# Patient Record
Sex: FEMALE | Race: WHITE | Employment: UNEMPLOYED | URBAN - METROPOLITAN AREA
[De-identification: names, ages, dates, MRNs, and addresses within clinical notes are randomized per-mention and may not be internally consistent; named-entity substitution may affect disease eponyms.]

---

## 2017-01-25 ENCOUNTER — ED HISTORICAL/CONVERTED ENCOUNTER (OUTPATIENT)
Dept: OTHER | Age: 52
End: 2017-01-25

## 2017-03-06 ENCOUNTER — ED HISTORICAL/CONVERTED ENCOUNTER (OUTPATIENT)
Dept: OTHER | Age: 52
End: 2017-03-06

## 2021-03-10 ENCOUNTER — TRANSCRIBE ORDER (OUTPATIENT)
Dept: SCHEDULING | Age: 56
End: 2021-03-10

## 2021-03-10 ENCOUNTER — HOSPITAL ENCOUNTER (OUTPATIENT)
Dept: CT IMAGING | Age: 56
Discharge: HOME | End: 2021-03-10
Attending: INTERNAL MEDICINE
Payer: COMMERCIAL

## 2021-03-10 DIAGNOSIS — R52 TENDERNESS: ICD-10-CM

## 2021-03-10 DIAGNOSIS — R10.9 ABDOMINAL PAIN: ICD-10-CM

## 2021-03-10 DIAGNOSIS — R10.9 ABDOMINAL PAIN: Primary | ICD-10-CM

## 2021-03-10 LAB
BUN SERPL-MCNC: 12 MG/DL (ref 6–20)
CREAT SERPL-MCNC: 0.95 MG/DL (ref 0.55–1.02)

## 2021-03-10 PROCEDURE — 82565 ASSAY OF CREATININE: CPT

## 2021-03-10 PROCEDURE — 36415 COLL VENOUS BLD VENIPUNCTURE: CPT

## 2021-03-10 PROCEDURE — 74011000636 HC RX REV CODE- 636: Performed by: INTERNAL MEDICINE

## 2021-03-10 PROCEDURE — 84520 ASSAY OF UREA NITROGEN: CPT

## 2021-03-10 PROCEDURE — 74178 CT ABD&PLV WO CNTR FLWD CNTR: CPT

## 2021-03-10 RX ADMIN — IOPAMIDOL 100 ML: 755 INJECTION, SOLUTION INTRAVENOUS at 14:03

## 2021-09-17 ENCOUNTER — OFFICE VISIT (OUTPATIENT)
Dept: HEMATOLOGY | Age: 56
End: 2021-09-17
Payer: COMMERCIAL

## 2021-09-17 VITALS
OXYGEN SATURATION: 96 % | HEIGHT: 67 IN | BODY MASS INDEX: 26.71 KG/M2 | WEIGHT: 170.2 LBS | SYSTOLIC BLOOD PRESSURE: 127 MMHG | DIASTOLIC BLOOD PRESSURE: 77 MMHG | HEART RATE: 94 BPM | TEMPERATURE: 96.3 F | RESPIRATION RATE: 16 BRPM

## 2021-09-17 DIAGNOSIS — K76.0 FATTY LIVER: Primary | ICD-10-CM

## 2021-09-17 PROBLEM — G89.29 CHRONIC BACK PAIN: Status: ACTIVE | Noted: 2021-09-17

## 2021-09-17 PROBLEM — E78.00 HYPERCHOLESTEROLEMIA: Status: ACTIVE | Noted: 2021-09-17

## 2021-09-17 PROBLEM — M54.9 CHRONIC BACK PAIN: Status: ACTIVE | Noted: 2021-09-17

## 2021-09-17 PROCEDURE — 99204 OFFICE O/P NEW MOD 45 MIN: CPT | Performed by: INTERNAL MEDICINE

## 2021-09-17 PROCEDURE — 91200 LIVER ELASTOGRAPHY: CPT | Performed by: INTERNAL MEDICINE

## 2021-09-17 RX ORDER — ESTRADIOL 0.5 MG/1
TABLET ORAL DAILY
COMMUNITY
End: 2022-03-18

## 2021-09-17 NOTE — PROGRESS NOTES
Identified pt with two pt identifiers(name and ). Reviewed record in preparation for visit and have obtained necessary documentation.   Chief Complaint   Patient presents with    New Patient      referral by Dr. Kartik Salazar for fatty liver disease        Health Maintenance Due   Topic    Hepatitis C Screening     COVID-19 Vaccine (1)    DTaP/Tdap/Td series (1 - Tdap)    Cervical cancer screen     Lipid Screen     Colorectal Cancer Screening Combo     Shingrix Vaccine Age 50> (1 of 2)    Breast Cancer Screen Mammogram     Flu Vaccine (1)        Visit Vitals  /77 (BP 1 Location: Right arm, BP Patient Position: Sitting, BP Cuff Size: Adult)   Pulse 94   Temp (!) 96.3 °F (35.7 °C) (Temporal)   Resp 16   Ht 5' 7\" (1.702 m)   Wt 170 lb 3.2 oz (77.2 kg)   SpO2 96%   BMI 26.66 kg/m²     Pain Scale: /10

## 2021-09-17 NOTE — Clinical Note
10/7/2021    Patient: Christa Fox   YOB: 1965   Date of Visit: 9/17/2021     [place]ío [place], MD  Formerly [facility] [id number]  Via In [address] [facility] 103 [address] [address]  Via Fax: [phone]    Dear MD Maegan Ortiz MD,      Thank you for referring Ms. Christa Fox to [address] for evaluation. My notes for this consultation are attached. If you have questions, please do not hesitate to call me. I look forward to following your patient along with you.       Sincerely,    Brandon Guerrero MD

## 2021-09-17 NOTE — PROGRESS NOTES
[address] [facility]      Sendy Castorena MD, Dilan Calvillo, Renetta Camargo MD, MPH      PETROS Moser, [facility]     Candelaria Starr, [facility]   Alton Burton, P[facility]    Luwanna Closs, [facility]       [place] Foote [facility] 136    at [address], [address] [place] [address], [facility] 22.    [phone]    FAX: [address] [place] [address], [address] [place] [address] - [address]    [phone]    FAX: [phone]       Patient Care Team:  Kristopher Magallanes MD as PCP - General (Internal Medicine)      Problem List  Date Reviewed: 9/17/2021        Codes Class Noted    Fatty liver ICD-10-CM: K76.0  ICD-9-CM: 571.8  9/17/2021        Chronic back pain ICD-10-CM: M54.9, G89.29  ICD-9-CM: 724.5, 338.29  9/17/2021        Hypercholesterolemia ICD-10-CM: E78.00  ICD-9-CM: 272.0  9/17/2021              The clinicians listed above have asked me to see Vamsi Kelley in consultation regarding elevated liver enzymes and its management. All medical records sent by the referring physicians were reviewed including imaging studies     The patient is a 64 y.o.  female who was found to have elevated  alkaline phosphatase in 5/2021. Serologic evaluation for markers of chronic liver disease was negative. Ultrasound of the liver was performed in 5/2021. The results of the imaging suggested fatty liver disease. Assessment of liver fibrosis with Fibroscan was performed in the office today. The result was 5.7 kPa which correlates with no fibrosis - stage 1 portal fibrosis. The CAP score of 331 suggests hepatic steatosis.     The patient has the following symptoms which could be attributed to the liver disorder:    pain in the right side over the liver,     The patient is not experiencing the following symptoms which are commonly seen in this liver disorder:   fatigue,     The patient completes all daily activities without any functional       ASSESSMENT AND PLAN:  Benign steatosis/NAFL  Suspect the patient has fatty liver based upon imaging, Fiboscan CAP score, features of metabolic syndrome, serologic studies that are negative for other causes of chronic liver disease,     A liver biopsy has not been performed. Fibroscan in 9/2021 demonstrated  5.7 kPa and  suggesting fatty and stage 0-1 fibrosis. NAFL is a benign form of fatty liver disease and not thought to progress to fibrosis or cirrhosis. Liver transaminases are normal.  ALP is elevated. Liver function is normal.  The platelet count is   normal.      Based upon laboratory studies Fibroscan, and imaging  the patient does not appear to have significant liver injury. Serologic testing for causes of chronic liver disease were all negative but I do not see HCV. She says she was told this was negative. Will repeat anti-HCV at the next office appointment to be certain. There is no reason to perform liver biopsy with a Fibroscan and Fibrosure both suggesting no fibrosis. The Fibroscan can be repeated annually or as often as clinically indicated to assess for fibrosis progression and/or regression. If the patient looses 20% of current body weight, which is 34 pounds, down to a weight of of 140 pounds, all steatosis will have resolved. Once all steatosis has resolved all inflammation will resolve. Then all fibrosis will gradually resolve and the liver could eventually be normal.    There is currently no FDA approved medical treatment for fatty liver, NALFD or SILVA. She could not be considered for a clinical trial for treatment of SILVA since Fatty liver is too mild and she does not appear to have SILVA.       Counseling for diet and weight loss in patients with confirmed or suspected NAFLD  The patient was counseled regarding diet and exercise to achieve weight loss. The best diet for patients with fatty liver is one very low in carbohydrates and enriched with protein such as an Ron's program.      The patient was told not to consume any food products and drinks containing fructose as this enhances hepatic fat synthesis. There is no medication or vitamin supplements that we advocate for SILVA. Using glitazones in patients without diabetes mellitus has been shown to reduce fat content in the liver but has no effect on fibrosis and is associated with weight gain. Vitamin E has also been used but the data is not very good and most experts no longer advocate this. Screening for Hepatocellular Carcinoma  HCC screening is not necessary since the patient has no evidence of cirrhosis. Treatment of other medical problems in patients with chronic liver disease  There are no contraindications for the patient to take most medications that are necessary for treatment of other medical issues. Normal doses of acetaminophen, as recommended on the label of the bottle, are not hepatotoxic except in the setting of daily alcohol use, even in patients with cirrhosis and can be utilized for pain. Counseling for alcohol in patients with chronic liver disease  The patient was counseled regarding alcohol consumption and the effect of alcohol on chronic liver disease. The patient does not consume any significant amount of alcohol. Vaccinations   The need for vaccination against viral hepatitis A and B will be assessed with serologic and instituted as appropriate. The patient has not received of COVID-19 vaccine. Routine vaccinations against other bacterial and viral agents can be performed as indicated. Annual flu vaccination should be administered if indicated.       ALLERGIES  No Known Allergies    MEDICATIONS  Current Outpatient Medications   Medication Sig    celecoxib (CELEBREX PO) Take  by mouth.    estradioL (ESTRACE) 0.5 mg tablet Take  by mouth daily. No current facility-administered medications for this visit. SYSTEM REVIEW NOT RELATED TO LIVER DISEASE OR REVIEWED ABOVE:  Constitution systems: Negative for fever, chills, weight gain, weight loss. Eyes: Negative for visual changes. ENT: Negative for sore throat, painful swallowing. Respiratory: Negative for cough, hemoptysis, SOB. Cardiology: Negative for chest pain, palpitations. GI:  Negative for constipation or diarrhea. : Negative for urinary frequency, dysuria, hematuria, nocturia. Skin: Negative for rash. Hematology: Negative for easy bruising, blood clots. Musculo-skelatal: Negative for back pain, muscle pain, weakness. Neurologic: Negative for headaches, dizziness, vertigo, memory problems not related to HE. Psychology: Negative for anxiety, depression. FAMILY HISTORY:  The father disease(s): COPD. The mother  of heart disease. There is no family history of liver disease. SOCIAL HISTORY:  The patient is . The patient has 2 children, and 6 grandchildren. The patient currently smokes 1/2 pack of tobacco daily. The patient has never consumed significant amounts of alcohol. The patient used to work as  at [employer]. The patient has not worked since . PHYSICAL EXAMINATION:  Visit Vitals  /77 (BP 1 Location: Right arm, BP Patient Position: Sitting, BP Cuff Size: Adult)   Pulse 94   Temp (!) 96.3 °F (35.7 °C) (Temporal)   Resp 16   Ht 5' 7\" (1.702 m)   Wt 170 lb 3.2 oz (77.2 kg)   SpO2 96%   BMI 26.66 kg/m²     General: No acute distress. Eyes: Sclera anicteric. ENT: No oral lesions. Thyroid normal.  Nodes: No adenopathy. Skin: No spider angiomata. No jaundice. No palmar erythema. Respiratory: Lungs clear to auscultation. Cardiovascular: Regular heart rate. No murmurs. No JVD. Abdomen: Soft non-tender.   Liver size normal to percussion/palpation. Spleen not palpable. No obvious ascites. Extremities: No edema. No muscle wasting. No gross arthritic changes. Neurologic: Alert and oriented. Cranial nerves grossly intact. No asterixis. LABORATORY STUDIES:  From 7/2021  AST/ALT/ALP/T Bili/ALB: 23/25/171/0.4/4.5  WBC/HB/PLT/INR:  NA/BUN/CREAT:    AFP 4.2    SEROLOGIES:  7/2021. HBsAntigen negative, anti-HBcore negative, Ferritin 372, iron saturation 20%, TAE negative,   ASMA negative, AMA negative, ANCA negative, ceruloplsmin 33, alpha-1-antitrypsin 155. LIVER HISTOLOGY:  7/2021. Fibrosure. A0, 0.12 consistent with no fibrosis. 9/2021. [facility]. EkPa was 5.7. IQR/med 18%. . The results suggested a fibrosis level of F0-1. The CAP score suggests there is hepatic steatosis. ENDOSCOPIC PROCEDURES:  5/2021. EGD by [facility]. Normal.  2/2021. Colonoscopy by [facility]. RADIOLOGY:  3/2021. Triple phase CT scan liver. Changes consistent with fatty liver. No liver mass lesions. Normal spleen. No ascites. 5/2021. Ultrasound of liver. Echogenic consistent with fatty liver. No liver mass lesions. No dilated bile ducts. No ascites. OTHER TESTING:  Not available or performed    FOLLOW-UP:  All of the issues listed above in the Assessment and Plan were discussed with the patient. All questions were answered. The patient expressed a clear understanding of the above. [address] [address] in 6 months for routine monitoring.       MD Emmanuelle Sims 13  [address], [address] [place] [address] 22.  [phone]  [address] [facility]

## 2022-03-18 ENCOUNTER — OFFICE VISIT (OUTPATIENT)
Dept: HEMATOLOGY | Age: 57
End: 2022-03-18
Payer: COMMERCIAL

## 2022-03-18 VITALS
BODY MASS INDEX: 28.06 KG/M2 | HEART RATE: 91 BPM | DIASTOLIC BLOOD PRESSURE: 83 MMHG | SYSTOLIC BLOOD PRESSURE: 138 MMHG | TEMPERATURE: 97.2 F | HEIGHT: 67 IN | WEIGHT: 178.8 LBS

## 2022-03-18 DIAGNOSIS — K76.0 FATTY LIVER: Primary | ICD-10-CM

## 2022-03-18 LAB
ALBUMIN SERPL-MCNC: 4.1 G/DL (ref 3.5–5)
ALBUMIN/GLOB SERPL: 1.1 {RATIO} (ref 1.1–2.2)
ALP SERPL-CCNC: 144 U/L (ref 45–117)
ALT SERPL-CCNC: 42 U/L (ref 12–78)
ANION GAP SERPL CALC-SCNC: 5 MMOL/L (ref 5–15)
AST SERPL-CCNC: 26 U/L (ref 15–37)
BASOPHILS # BLD: 0 K/UL (ref 0–0.1)
BASOPHILS NFR BLD: 0 % (ref 0–1)
BILIRUB DIRECT SERPL-MCNC: 0.2 MG/DL (ref 0–0.2)
BILIRUB SERPL-MCNC: 0.4 MG/DL (ref 0.2–1)
BUN SERPL-MCNC: 17 MG/DL (ref 6–20)
BUN/CREAT SERPL: 14 (ref 12–20)
CALCIUM SERPL-MCNC: 9.4 MG/DL (ref 8.5–10.1)
CHLORIDE SERPL-SCNC: 108 MMOL/L (ref 97–108)
CO2 SERPL-SCNC: 26 MMOL/L (ref 21–32)
CREAT SERPL-MCNC: 1.19 MG/DL (ref 0.55–1.02)
DIFFERENTIAL METHOD BLD: NORMAL
EOSINOPHIL # BLD: 0.3 K/UL (ref 0–0.4)
EOSINOPHIL NFR BLD: 4 % (ref 0–7)
ERYTHROCYTE [DISTWIDTH] IN BLOOD BY AUTOMATED COUNT: 13.2 % (ref 11.5–14.5)
GLOBULIN SER CALC-MCNC: 3.6 G/DL (ref 2–4)
GLUCOSE SERPL-MCNC: 102 MG/DL (ref 65–100)
HCT VFR BLD AUTO: 40.5 % (ref 35–47)
HGB BLD-MCNC: 13.3 G/DL (ref 11.5–16)
IMM GRANULOCYTES # BLD AUTO: 0 K/UL (ref 0–0.04)
IMM GRANULOCYTES NFR BLD AUTO: 0 % (ref 0–0.5)
LYMPHOCYTES # BLD: 2.9 K/UL (ref 0.8–3.5)
LYMPHOCYTES NFR BLD: 34 % (ref 12–49)
MCH RBC QN AUTO: 31.1 PG (ref 26–34)
MCHC RBC AUTO-ENTMCNC: 32.8 G/DL (ref 30–36.5)
MCV RBC AUTO: 94.6 FL (ref 80–99)
MONOCYTES # BLD: 0.5 K/UL (ref 0–1)
MONOCYTES NFR BLD: 6 % (ref 5–13)
NEUTS SEG # BLD: 4.8 K/UL (ref 1.8–8)
NEUTS SEG NFR BLD: 56 % (ref 32–75)
NRBC # BLD: 0 K/UL (ref 0–0.01)
NRBC BLD-RTO: 0 PER 100 WBC
PLATELET # BLD AUTO: 213 K/UL (ref 150–400)
PMV BLD AUTO: 10.5 FL (ref 8.9–12.9)
POTASSIUM SERPL-SCNC: 4.5 MMOL/L (ref 3.5–5.1)
PROT SERPL-MCNC: 7.7 G/DL (ref 6.4–8.2)
RBC # BLD AUTO: 4.28 M/UL (ref 3.8–5.2)
SODIUM SERPL-SCNC: 139 MMOL/L (ref 136–145)
WBC # BLD AUTO: 8.5 K/UL (ref 3.6–11)

## 2022-03-18 PROCEDURE — 99213 OFFICE O/P EST LOW 20 MIN: CPT | Performed by: NURSE PRACTITIONER

## 2022-03-18 RX ORDER — PROGESTERONE 100 MG/1
CAPSULE ORAL
COMMUNITY
Start: 2022-03-02

## 2022-03-18 RX ORDER — ASPIRIN 325 MG
TABLET, DELAYED RELEASE (ENTERIC COATED) ORAL
COMMUNITY
Start: 2022-01-06

## 2022-03-18 RX ORDER — AZITHROMYCIN 250 MG/1
TABLET, FILM COATED ORAL
COMMUNITY
Start: 2021-12-09 | End: 2022-06-20

## 2022-03-18 RX ORDER — ROSUVASTATIN CALCIUM 40 MG/1
40 TABLET, COATED ORAL EVERY EVENING
COMMUNITY
Start: 2021-12-09

## 2022-03-18 NOTE — PROGRESS NOTES
Yury Shine MD, Bety Panchal MD, MPH      Roselle Sandhoff, PETROS Carreon, [facility]     Candelaria Starr, [facility]   Shauna Olszewski, SAIDA-GERARDO Blank, [facility]       Madanal[place]ia Foote [facility] 136    at [address], [address] [place] [address], [facility] 22.    [phone]    FAX: [address] [place] [address], [address]    [phone]    FAX: [phone]       Patient Care Team:  Marion Hunt MD as PCP - General (Internal Medicine)      Problem List  Date Reviewed: 10/7/2021          Codes Class Noted    Fatty liver ICD-10-CM: K76.0  ICD-9-CM: 571.8  9/17/2021        Chronic back pain ICD-10-CM: M54.9, G89.29  ICD-9-CM: 724.5, 338.29  9/17/2021        Hypercholesterolemia ICD-10-CM: E78.00  ICD-9-CM: 272.0  9/17/2021            Rosette Earl is being seen at [facility] for management of non-alcoholic fatty liver (NAFL). The active problem list, all pertinent past medical history, medications, radiologic findings and laboratory findings related to the liver disorder were reviewed and discussed with the patient. The patient is a 62 y.o.  female who was found to have elevated  alkaline phosphatase in 5/2021. Serologic evaluation for markers of chronic liver disease were negative. Ultrasound of the liver was performed in 5/2021. The results of the imaging suggested fatty liver disease. Assessment of liver fibrosis with Fibroscan was performed 9/2021. The result was 5.7 kPa which correlates with no fibrosis - stage 1 portal fibrosis. The CAP score of 331 suggests hepatic steatosis.     The patient has the following symptoms which could be attributed to the liver disorder: Pain in the right side over the liver. The patient is not experiencing the following symptoms which are commonly seen in this liver disorder: fatigue, abdominal swelling or itching. The patient completes all daily activities without any functional       ASSESSMENT AND PLAN:  Benign steatosis/NAFL  Suspect the patient has fatty liver based upon imaging, Fiboscan CAP score, features of metabolic syndrome, serologic studies that are negative for other causes of chronic liver disease,     A liver biopsy has not been performed. Fibroscan in 9/2021 demonstrated  5.7 kPa and  suggesting fatty and stage 0-1 fibrosis. NAFL is a benign form of fatty liver disease and not thought to progress to fibrosis or cirrhosis. Have performed laboratory testing to monitor liver function and degree of liver injury. This included BMP, hepatic panel, and CBC with platelet count. Laboratory testing from 7/2021 reviewed in detail. Follow-up testing ordered today. The AST is normal. The ALT is elevated. The ALP is elevated but improved. The liver function and platelet count are normal.     Serologic testing for causes of chronic liver disease were negative. Anti-HCV needs to be drawn at the next office visit. There is no reason to perform liver biopsy with a Fibroscan and Fibrosure both suggesting no fibrosis. The Fibroscan can be repeated annually or as often as clinically indicated to assess for fibrosis progression and/or regression. If the patient looses 10% of current body weight, which is 34 pounds, down to a weight of of 140 pounds, steatosis should resolve. Once all steatosis has resolved inflammation will resolve. Counseling for diet and weight loss in patients with confirmed or suspected NAFLD  The patient was counseled regarding diet and exercise to achieve weight loss.   The best diet for patients with fatty liver is one very low in carbohydrates and enriched with protein such as an Ron's program.      The patient was told not to consume any food products and drinks containing fructose as this enhances hepatic fat synthesis. Screening for Hepatocellular Carcinoma  HCC screening is not necessary since the patient has no evidence of cirrhosis. Treatment of other medical problems in patients with chronic liver disease  There are no contraindications for the patient to take most medications that are necessary for treatment of other medical issues. Normal doses of acetaminophen, as recommended on the label of the bottle, are not hepatotoxic except in the setting of daily alcohol use, even in patients with cirrhosis and can be utilized for pain. Counseling for alcohol in patients with chronic liver disease  The patient was counseled regarding alcohol consumption and the effect of alcohol on chronic liver disease. The patient does not consume any significant amount of alcohol. Vaccinations   The need for vaccination against viral hepatitis A and B will be assessed with serologic and instituted as appropriate. The patient has not received of COVID-19 vaccine. Routine vaccinations against other bacterial and viral agents can be performed as indicated. Annual flu vaccination should be administered if indicated. ALLERGIES  No Known Allergies    MEDICATIONS  Current Outpatient Medications   Medication Sig    progesterone (PROMETRIUM) 100 mg capsule TAKE TWO CAPSULES BY MOUTH NIGHTLY    rosuvastatin (CRESTOR) 40 mg tablet Take 40 mg by mouth every evening.  cholecalciferol (VITAMIN D3) (50,000 UNITS /1250 MCG) capsule TAKE ONE CAPSULE BY MOUTH ONCE WEEKLY    azithromycin (ZITHROMAX) 250 mg tablet TAKE 2 TABLETS BY MOUTH ON DAY 1 THEN TAKE 1 TABLET EVERY DAY FOR THE NEXT 4 DAYS    celecoxib (CELEBREX PO) Take  by mouth.  estradioL (ESTRACE) 0.5 mg tablet Take  by mouth daily. No current facility-administered medications for this visit.        SYSTEM REVIEW NOT RELATED TO LIVER DISEASE OR REVIEWED ABOVE:  Constitution systems: Negative for fever, chills, weight gain, weight loss. Eyes: Negative for visual changes. ENT: Negative for sore throat, painful swallowing. Respiratory: Negative for cough, hemoptysis, SOB. Cardiology: Negative for chest pain, palpitations. GI:  Negative for constipation or diarrhea. : Negative for urinary frequency, dysuria, hematuria, nocturia. Skin: Negative for rash. Hematology: Negative for easy bruising, blood clots. Musculo-skeletal: Negative for back pain, muscle pain, weakness. Neurologic: Negative for headaches, dizziness, vertigo, memory problems not related to HE. Psychology: Negative for anxiety, depression. FAMILY HISTORY:  The father disease(s): COPD. The mother  of heart disease. There is no family history of liver disease. SOCIAL HISTORY:  The patient is . The patient has 2 children, and 6 grandchildren. The patient currently smokes 1/2 pack of tobacco daily. The patient has never consumed significant amounts of alcohol. The patient used to work as  at [employer]. The patient has not worked since . PHYSICAL EXAMINATION:  Visit Vitals  /83 (BP 1 Location: Right arm, BP Patient Position: Sitting, BP Cuff Size: Adult)   Pulse 91   Temp 97.2 °F (36.2 °C) (Temporal)   Ht 5' 7\" (1.702 m)   Wt 178 lb 12.8 oz (81.1 kg)   BMI 28.00 kg/m²       General: No acute distress. Eyes: Sclera anicteric. ENT: No oral lesions. Thyroid normal.  Nodes: No adenopathy. Skin: No spider angiomata. No jaundice. No palmar erythema. Respiratory: Lungs clear to auscultation. Cardiovascular: Regular heart rate. No murmurs. No JVD. Abdomen: Soft non-tender, liver size normal to percussion/palpation. Spleen not palpable. No obvious ascites. Extremities: No edema. No muscle wasting. No gross arthritic changes. Neurologic: Alert and oriented.   Cranial nerves grossly intact. No asterixis. LABORATORY STUDIES:  Liver [place] of [address] [place] [address] 3/18/2022 3/10/2021   WBC 3.6 - 11.0 K/uL 8.5    ANC 1.8 - 8.0 K/UL 4.8    HGB 11.5 - 16.0 g/dL 13.3     - 400 K/uL 213    AST 15 - 37 U/L 26    ALT 12 - 78 U/L 42    Alk Phos 45 - 117 U/L 144 (H)    Bili, Total 0.2 - 1.0 MG/DL 0.4    Bili, Direct 0.0 - 0.2 MG/DL 0.2    Albumin 3.5 - 5.0 g/dL 4.1    BUN 6 - 20 MG/DL 17 12   Creat 0.55 - 1.02 MG/DL 1.19 (H) 0.95   Na 136 - 145 mmol/L 139    K 3.5 - 5.1 mmol/L 4.5    Cl 97 - 108 mmol/L 108    CO2 21 - 32 mmol/L 26    Glucose 65 - 100 mg/dL 102 (H)      From 7/2021  AST/ALT/ALP/T Bili/ALB: 23/25/171/0.4/4.5  WBC/HB/PLT/INR:  NA/BUN/CREAT:    AFP 4.2    SEROLOGIES:  7/2021. HBsAntigen negative, anti-HBcore negative, Ferritin 372, iron saturation 20%, TAE negative, ASMA negative, AMA negative, ANCA negative, ceruloplsmin 33, alpha-1-antitrypsin 155. LIVER HISTOLOGY:  7/2021. Fibrosure. A0, 0.12 consistent with no fibrosis. 9/2021. FibroScan performed at [address]. EkPa was 5.7. IQR/med 18%. . The results suggested a fibrosis level of F0-1. The CAP score suggests there is hepatic steatosis. ENDOSCOPIC PROCEDURES:  5/2021. EGD by [facility]. Normal.  2/2021. Colonoscopy by [facility]. RADIOLOGY:  3/2021. Triple phase CT scan liver. Changes consistent with fatty liver. No liver mass lesions. Normal spleen. No ascites. 5/2021. Ultrasound of liver. Echogenic consistent with fatty liver. No liver mass lesions. No dilated bile ducts. No ascites. OTHER TESTING:  Not available or performed    FOLLOW-UP:  All of the issues listed above in the Assessment and Plan were discussed with the patient. All questions were answered. The patient expressed a clear understanding of the above. [address] [address] in 3 months for a Fibroscan. GENO FordNP-[facility] of [address], [facility].  [address] [facility]

## 2022-03-18 NOTE — PROGRESS NOTES
Identified pt with two pt identifiers(name and ). Reviewed record in preparation for visit and have obtained necessary documentation. Chief Complaint   Patient presents with    Fatty Liver     6month f/u with April      Vitals:    22 0859   BP: 138/83   Pulse: 91   Temp: 97.2 °F (36.2 °C)   TempSrc: Temporal   Weight: 178 lb 12.8 oz (81.1 kg)   Height: 5' 7\" (1.702 m)   PainSc:   0 - No pain       Health Maintenance Review: Patient reminded of \"due or due soon\" health maintenance. I have asked the patient to contact his/her primary care provider (PCP) for follow-up on his/her health maintenance. Coordination of Care Questionnaire:  :   1) Have you been to an emergency room, urgent care, or hospitalized since your last visit? If yes, where when, and reason for visit? no       2. Have seen or consulted any other health care provider since your last visit? If yes, where when, and reason for visit? NO      Patient is accompanied by  I have received verbal consent from Seema Marie to discuss any/all medical information while they are present in the room.

## 2022-03-20 PROBLEM — M54.9 CHRONIC BACK PAIN: Status: ACTIVE | Noted: 2021-09-17

## 2022-03-20 PROBLEM — G89.29 CHRONIC BACK PAIN: Status: ACTIVE | Noted: 2021-09-17

## 2022-03-20 PROBLEM — K76.0 FATTY LIVER: Status: ACTIVE | Noted: 2021-09-17

## 2022-03-20 PROBLEM — E78.00 HYPERCHOLESTEROLEMIA: Status: ACTIVE | Noted: 2021-09-17

## 2022-03-21 NOTE — PROGRESS NOTES
Letter sent to the patient regarding the blood work results. The ALP improved, ALT increased slightly. The serum creatinine was elevated slightly, advised she increase hydration.  All other testing was normal.

## 2022-06-20 ENCOUNTER — OFFICE VISIT (OUTPATIENT)
Dept: HEMATOLOGY | Age: 57
End: 2022-06-20
Payer: COMMERCIAL

## 2022-06-20 VITALS
HEIGHT: 67 IN | DIASTOLIC BLOOD PRESSURE: 79 MMHG | OXYGEN SATURATION: 99 % | SYSTOLIC BLOOD PRESSURE: 151 MMHG | BODY MASS INDEX: 28.56 KG/M2 | WEIGHT: 182 LBS | TEMPERATURE: 97.2 F | HEART RATE: 79 BPM

## 2022-06-20 DIAGNOSIS — K76.0 FATTY LIVER: Primary | ICD-10-CM

## 2022-06-20 PROCEDURE — 99213 OFFICE O/P EST LOW 20 MIN: CPT | Performed by: NURSE PRACTITIONER

## 2022-06-20 PROCEDURE — 91200 LIVER ELASTOGRAPHY: CPT | Performed by: NURSE PRACTITIONER

## 2022-06-20 NOTE — PROGRESS NOTES
3340 [facility], MD, [address], [place], [place]      PETROS Contreras, ACNP-SOHAIL Gaona LEROY Starr, [facility]   ELENA Penaliharjinder Lantigua, [facility]       Madan Deputado Bradley De [place] 136    at [address], 81 [facility], [facility] 22.    [phone]    FAX: [address] [place] [address], [address]    [phone]    FAX: [phone]       Patient Care Team:  Андрей Moreno MD as PCP - General (Internal Medicine Physician)      Problem List  Date Reviewed: 3/20/2022          Codes Class Noted    Fatty liver ICD-10-CM: K76.0  ICD-9-CM: 571.8  9/17/2021        Chronic back pain ICD-10-CM: M54.9, G89.29  ICD-9-CM: 724.5, 338.29  9/17/2021        Hypercholesterolemia ICD-10-CM: E78.00  ICD-9-CM: 272.0  9/17/2021            Bradley Dillard [facility] for management of non-alcoholic fatty liver (NAFL). The active problem list, all pertinent past medical history, medications, radiologic findings and laboratory findings related to the liver disorder were reviewed and discussed with the patient. The patient is a 62 y.o.  female who was found to have elevated  alkaline phosphatase in 5/2021. Serologic evaluation for markers of chronic liver disease were negative. Ultrasound of the liver was performed in 5/2021. The results of the imaging suggested fatty liver disease. Assessment of liver fibrosis with Fibroscan was performed 9/2021. The result was 5.7 kPa which correlates with no fibrosis - stage 1 portal fibrosis. The CAP score of 331 suggests hepatic steatosis. She returns for Fibroscan today. Since the last office visit the patient has made dietary changes.  She eats bread daily and has now changed to low carbohydrate bread. The patient has the following symptoms which could be attributed to the liver disorder:    Pain in the right side over the liver. The patient is not experiencing the following symptoms which are commonly seen in this liver disorder: fatigue, abdominal swelling or itching. The patient completes all daily activities without any functional       ASSESSMENT AND PLAN:  Benign steatosis/NAFL  Suspect the patient has fatty liver based upon imaging, Fiboscan CAP score, features of metabolic syndrome, serologic studies that are negative for other causes of chronic liver disease,     A liver biopsy has not been performed. Fibroscan in 9/2021 demonstrated  5.7 kPa and  suggesting fatty and stage 0-1 fibrosis. Assessment of liver fibrosis was performed with Fibroscan in the office today. The result was 7.1 kPa which correlates with stage 1 portal fibrosis. The CAP score of 380 suggests hepatic steatosis. Have performed laboratory testing to monitor liver function and degree of liver injury. This included BMP, hepatic panel, and CBC with platelet count. Laboratory testing from 3/18/2022 reviewed in detail. Follow-up testing ordered today. The liver transaminases are normal. The ALP is elevated. The liver function is normal. The platelet count is normal.     Serologic testing for causes of chronic liver disease were negative. There is no reason to perform liver biopsy with a Fibroscan and Fibrosure both suggesting no fibrosis. The Fibroscan can be repeated annually or as often as clinically indicated to assess for fibrosis progression and/or regression. If the patient looses 10% of current body weight, which is 34 pounds, down to a weight of of 140 pounds, steatosis should resolve. Once all steatosis has resolved inflammation will resolve.     Counseling for diet and weight loss in patients with confirmed or suspected NAFLD  The patient was counseled regarding diet and exercise to achieve weight loss. The best diet for patients with fatty liver is one very low in carbohydrates and enriched with protein. The patient was told not to consume any food products and drinks containing fructose as this enhances hepatic fat synthesis. Screening for Hepatocellular Carcinoma  HCC screening is not necessary since the patient has no evidence of cirrhosis. Treatment of other medical problems in patients with chronic liver disease  There are no contraindications for the patient to take most medications that are necessary for treatment of other medical issues. Normal doses of acetaminophen, as recommended on the label of the bottle, are not hepatotoxic except in the setting of daily alcohol use, even in patients with cirrhosis and can be utilized for pain. Counseling for alcohol in patients with chronic liver disease  The patient was counseled regarding alcohol consumption and the effect of alcohol on chronic liver disease. The patient does not consume any significant amount of alcohol. Vaccinations   The patient has not received of COVID-19 vaccine. Routine vaccinations against other bacterial and viral agents can be performed as indicated. Annual flu vaccination should be administered if indicated. ALLERGIES  No Known Allergies    MEDICATIONS  Current Outpatient Medications   Medication Sig    progesterone (PROMETRIUM) 100 mg capsule TAKE TWO CAPSULES BY MOUTH NIGHTLY    rosuvastatin (CRESTOR) 40 mg tablet Take 40 mg by mouth every evening.  cholecalciferol (VITAMIN D3) (50,000 UNITS /1250 MCG) capsule TAKE ONE CAPSULE BY MOUTH ONCE WEEKLY    celecoxib (CELEBREX PO) Take  by mouth.     azithromycin (ZITHROMAX) 250 mg tablet TAKE 2 TABLETS BY MOUTH ON DAY 1 THEN TAKE 1 TABLET EVERY DAY FOR THE NEXT 4 DAYS (Patient not taking: Reported on 6/20/2022)     No current facility-administered medications for this visit.       SYSTEM REVIEW NOT RELATED TO LIVER DISEASE OR REVIEWED ABOVE:  Constitution systems: Negative for fever, chills, weight gain, weight loss. Eyes: Negative for visual changes. ENT: Negative for sore throat, painful swallowing. Respiratory: Negative for cough, hemoptysis, SOB. Cardiology: Negative for chest pain, palpitations. GI:  Negative for constipation or diarrhea. : Negative for urinary frequency, dysuria, hematuria, nocturia. Skin: Negative for rash. Hematology: Negative for easy bruising, blood clots. Musculo-skeletal: Negative for back pain, muscle pain, weakness. Neurologic: Negative for headaches, dizziness, vertigo, memory problems not related to HE. Psychology: Negative for anxiety, depression. FAMILY HISTORY:  The father disease(s): COPD. The mother  of heart disease. There is no family history of liver disease. SOCIAL HISTORY:  The patient is . The patient has 2 children, and 6 grandchildren. The patient currently smokes 1/2 pack of tobacco daily. The patient has never consumed significant amounts of alcohol. The patient used to work as  at [employer]. The patient has not worked since . PHYSICAL EXAMINATION:  Visit Vitals  BP (!) 151/79 (BP 1 Location: Right arm, BP Patient Position: Sitting, BP Cuff Size: Adult long)   Pulse 79   Temp 97.2 °F (36.2 °C) (Temporal)   Ht 5' 7\" (1.702 m)   Wt 182 lb (82.6 kg)   SpO2 99%   BMI 28.51 kg/m²       General: No acute distress. Eyes: Sclera anicteric. Skin: No spider angiomata. No jaundice. No palmar erythema. Abdomen: Soft non-tender, liver size normal to percussion/palpation. Spleen not palpable. No obvious ascites. Extremities: No edema. No muscle wasting. No gross arthritic changes. Neurologic: Alert and oriented. Cranial nerves grossly intact. No asterixis.     LABORATORY STUDIES:  Liver [place] of [address] & Units 2022 3/18/2022   WBC 3.4 - 10.8 x10E3/uL 7.2 8.5   ANC 1.4 - 7.0 x10E3/uL 3.7 4.8   HGB 11.1 - 15.9 g/dL 13.3 13.3    - 450 x10E3/uL 213 213   AST 0 - 40 IU/L 21 26   ALT 0 - 32 IU/L 24 42   Alk Phos 44 - 121 IU/L 147 (H) 144 (H)   Bili, Total 0.0 - 1.2 mg/dL 0.4 0.4   Bili, Direct 0.00 - 0.40 mg/dL 0.15 0.2   Albumin 3.8 - 4.9 g/dL 4.5 4.1   BUN 6 - 24 mg/dL 11 17   Creat 0.57 - 1.00 mg/dL 0.90 1.19 (H)   Na 134 - 144 mmol/L 143 139   K 3.5 - 5.2 mmol/L 4.1 4.5   Cl 96 - 106 mmol/L 104 108   CO2 20 - 29 mmol/L 24 26   Glucose 65 - 99 mg/dL 102 (H) 102 (H)     From 7/2021  AST/ALT/ALP/T Bili/ALB: 23/25/171/0.4/4.5  WBC/HB/PLT/INR:  NA/BUN/CREAT:    AFP 4.2    Laboratory testing from 3/18/2022 reviewed in detail. Additional testing included to evaluate progression or regression of disease. Laboratory testing results from today will be communicated by mail. SEROLOGIES:  7/2021. HBsAntigen negative, anti-HBcore negative, Ferritin 372, iron saturation 20%, TAE negative, ASMA negative, AMA negative, ANCA negative, ceruloplsmin 33, alpha-1-antitrypsin 155. LIVER HISTOLOGY:  7/2021. Fibrosure. A0, 0.12 consistent with no fibrosis. 9/2021. FibroScan performed at [facility]. EkPa was 5.7. IQR/med 18%. . The results suggested a fibrosis level of F0-1. The CAP score suggests there is hepatic steatosis. 6/2022. FibroScan performed at [facility]. EkPa was 7.1. IQR/med 12%. . The results suggested a fibrosis level of F1. The CAP score suggests there is hepatic steatosis. ENDOSCOPIC PROCEDURES:  5/2021. EGD by [facility]. Normal.  2/2021. Colonoscopy by [facility]. RADIOLOGY:  3/2021. Triple phase CT scan liver. Changes consistent with fatty liver. No liver mass lesions. Normal spleen. No ascites. 5/2021. Ultrasound of liver. Echogenic consistent with fatty liver. No liver mass lesions. No dilated bile ducts. No ascites.     OTHER TESTING:  Not available or performed    FOLLOW-UP:  All of the issues listed above in the Assessment and Plan were discussed with the patient. All questions were answered. The patient expressed a clear understanding of the above. [address] [place] [place] [address] in 6 months for ongoing monitoring. MARGI Ford-[facility] of [address] 77 [address] Robert Morales, 2000 [facility] 22.  201 [facility]

## 2022-06-21 LAB
ALBUMIN SERPL-MCNC: 4.5 G/DL (ref 3.8–4.9)
ALP SERPL-CCNC: 147 IU/L (ref 44–121)
ALT SERPL-CCNC: 24 IU/L (ref 0–32)
AST SERPL-CCNC: 21 IU/L (ref 0–40)
BASOPHILS # BLD AUTO: 0.1 X10E3/UL (ref 0–0.2)
BASOPHILS NFR BLD AUTO: 1 %
BILIRUB DIRECT SERPL-MCNC: 0.15 MG/DL (ref 0–0.4)
BILIRUB SERPL-MCNC: 0.4 MG/DL (ref 0–1.2)
BUN SERPL-MCNC: 11 MG/DL (ref 6–24)
BUN/CREAT SERPL: 12 (ref 9–23)
CALCIUM SERPL-MCNC: 9.2 MG/DL (ref 8.7–10.2)
CHLORIDE SERPL-SCNC: 104 MMOL/L (ref 96–106)
CO2 SERPL-SCNC: 24 MMOL/L (ref 20–29)
CREAT SERPL-MCNC: 0.9 MG/DL (ref 0.57–1)
EGFR: 75 ML/MIN/1.73
EOSINOPHIL # BLD AUTO: 0.2 X10E3/UL (ref 0–0.4)
EOSINOPHIL NFR BLD AUTO: 3 %
ERYTHROCYTE [DISTWIDTH] IN BLOOD BY AUTOMATED COUNT: 13.2 % (ref 11.7–15.4)
GLUCOSE SERPL-MCNC: 102 MG/DL (ref 65–99)
HCT VFR BLD AUTO: 39.2 % (ref 34–46.6)
HGB BLD-MCNC: 13.3 G/DL (ref 11.1–15.9)
IMM GRANULOCYTES # BLD AUTO: 0 X10E3/UL (ref 0–0.1)
IMM GRANULOCYTES NFR BLD AUTO: 1 %
LYMPHOCYTES # BLD AUTO: 2.8 X10E3/UL (ref 0.7–3.1)
LYMPHOCYTES NFR BLD AUTO: 39 %
MCH RBC QN AUTO: 31 PG (ref 26.6–33)
MCHC RBC AUTO-ENTMCNC: 33.9 G/DL (ref 31.5–35.7)
MCV RBC AUTO: 91 FL (ref 79–97)
MONOCYTES # BLD AUTO: 0.5 X10E3/UL (ref 0.1–0.9)
MONOCYTES NFR BLD AUTO: 6 %
NEUTROPHILS # BLD AUTO: 3.7 X10E3/UL (ref 1.4–7)
NEUTROPHILS NFR BLD AUTO: 50 %
PLATELET # BLD AUTO: 213 X10E3/UL (ref 150–450)
POTASSIUM SERPL-SCNC: 4.1 MMOL/L (ref 3.5–5.2)
PROT SERPL-MCNC: 7 G/DL (ref 6–8.5)
RBC # BLD AUTO: 4.29 X10E6/UL (ref 3.77–5.28)
SODIUM SERPL-SCNC: 143 MMOL/L (ref 134–144)
WBC # BLD AUTO: 7.2 X10E3/UL (ref 3.4–10.8)

## 2022-12-22 ENCOUNTER — VIRTUAL VISIT (OUTPATIENT)
Dept: HEMATOLOGY | Age: 57
End: 2022-12-22
Payer: COMMERCIAL

## 2022-12-22 DIAGNOSIS — K76.0 FATTY LIVER: Primary | ICD-10-CM

## 2022-12-22 PROCEDURE — 99213 OFFICE O/P EST LOW 20 MIN: CPT | Performed by: NURSE PRACTITIONER

## 2022-12-22 RX ORDER — TRAMADOL HYDROCHLORIDE 50 MG/1
TABLET ORAL
COMMUNITY
Start: 2022-12-05

## 2022-12-22 NOTE — PROGRESS NOTES
Identified pt with two pt identifiers(name and ). Reviewed record in preparation for visit and have obtained necessary documentation. Chief Complaint   Patient presents with    Follow-up      There were no vitals filed for this visit. Health Maintenance Review: Patient reminded of \"due or due soon\" health maintenance. I have asked the patient to contact his/her primary care provider (PCP) for follow-up on his/her health maintenance. Coordination of Care Questionnaire:  :   1) Have you been to an emergency room, urgent care, or hospitalized since your last visit? If yes, where when, and reason for visit? no       2. Have seen or consulted any other health care provider since your last visit? If yes, where when, and reason for visit? YES/ check up      Patient is accompanied by self I have received verbal consent from Love Tellez to discuss any/all medical information while they are present in the room.

## 2022-12-22 NOTE — PROGRESS NOTES
[facility] [facility], [place], [address], [place], [place]      PETROS Rosenthal, [facility]-BC     Candelaria Starr, [facility]   ELENA Pete, [facility]       Madan Foote Bradley De Cameron 136    at [address], [facility] Coleman Keller  22.    [phone]    FAX: [address] [place] [address], [address]    [phone]    FAX: [phone]       Patient Care Team:  Patrisha Meigs, MD as PCP - General (Internal Medicine Physician)      Problem List  Date Reviewed: 6/21/2022            Codes Class Noted    Fatty liver ICD-10-CM: K76.0  ICD-9-CM: 571.8  9/17/2021        Chronic back pain ICD-10-CM: M54.9, G89.29  ICD-9-CM: 724.5, 338.29  9/17/2021        Hypercholesterolemia ICD-10-CM: E78.00  ICD-9-CM: 272.0  9/17/2021         VIRTUAL TELEHEALTH VISIT PERFORMED DUE TO COVID-19 EPIDEMIC    CONSENT:    Seema Marie, was evaluated through a synchronous (real-time) audio-video encounter. The patient (or guardian if applicable) is aware that this is a billable service, which includes applicable co-pays. This Virtual Visit was conducted with patient's (and/or legal guardian's) consent. The visit was conducted pursuant to the emergency declaration under the [facility], [address] [place] [address] authority and the [employer] Act. Patient identification was verified, and a caregiver was present when appropriate. The patient was located at: Home  The provider was located at: Rodrigo Loera [facility] for management of non-alcoholic fatty liver (NAFL).  The active problem list, all pertinent past medical history, medications, radiologic findings and laboratory findings related to the liver disorder were reviewed and discussed with the patient. The patient is a 62 y.o.  female who was found to have elevated  alkaline phosphatase in 5/2021. Serologic evaluation for markers of chronic liver disease were negative. Ultrasound of the liver was performed in 5/2021. The results of the imaging suggested fatty liver disease. Assessment of liver fibrosis with Fibroscan was performed 9/2021. The result was 5.7 kPa which correlates with no fibrosis - stage 1 portal fibrosis. The CAP score of 331 suggests hepatic steatosis. Assessment of liver fibrosis was performed with Fibroscan 6/2022  The result was 7.1 kPa which correlates with stage 1 portal fibrosis. The CAP score of 380 suggests hepatic steatosis. Since the last office visit the patient has been experiencing low back pain. She was unable to finish PT and may need surgery. The patient has the following symptoms which could be attributed to the liver disorder:    Pain in the right side over the liver. The patient is not experiencing the following symptoms which are commonly seen in this liver disorder: fatigue, abdominal swelling or itching. The patient completes all daily activities without any functional     ASSESSMENT AND PLAN:  Benign steatosis/NAFL  Suspect the patient has fatty liver based upon imaging, Fiboscan CAP score, features of metabolic syndrome, serologic studies that are negative for other causes of chronic liver disease,     A liver biopsy has not been performed. Fibroscan in 9/2021 demonstrated  5.7 kPa and  suggesting fatty and stage 0-1 fibrosis. Assessment of liver fibrosis was performed with Fibroscan 6/2022  The result was 7.1 kPa which correlates with stage 1 portal fibrosis. The CAP score of 380 suggests hepatic steatosis.     Have performed laboratory testing to monitor liver function and degree of liver injury. This included BMP, hepatic panel, and CBC with platelet count. Laboratory testing from 6/20/2022 reviewed in detail. Follow-up testing ordered today. The liver transaminases are normal. The ALP is elevated. The liver function is normal. The platelet count is normal.     Serologic testing for causes of chronic liver disease were negative. There is no reason to perform liver biopsy with a Fibroscan and Fibrosure both suggesting no fibrosis. The Fibroscan can be repeated annually or as often as clinically indicated to assess for fibrosis progression and/or regression. If the patient looses 10% of current body weight, which is 34 pounds, down to a weight of of 140 pounds, steatosis should resolve. Once all steatosis has resolved inflammation will resolve. Counseling for diet and weight loss in patients with confirmed or suspected NAFLD  The patient was counseled regarding diet and exercise to achieve weight loss. The best diet for patients with fatty liver is one very low in carbohydrates and enriched with protein. The patient was told not to consume any food products and drinks containing fructose as this enhances hepatic fat synthesis. Screening for Hepatocellular Carcinoma  HCC screening is not necessary since the patient has no evidence of cirrhosis. Treatment of other medical problems in patients with chronic liver disease  There are no contraindications for the patient to take most medications that are necessary for treatment of other medical issues. Normal doses of acetaminophen, as recommended on the label of the bottle, are not hepatotoxic except in the setting of daily alcohol use, even in patients with cirrhosis and can be utilized for pain. Counseling for alcohol in patients with chronic liver disease  The patient was counseled regarding alcohol consumption and the effect of alcohol on chronic liver disease. The patient does not consume any significant amount of alcohol. Vaccinations   The patient has not received of COVID-19 vaccine. Routine vaccinations against other bacterial and viral agents can be performed as indicated. Annual flu vaccination should be administered if indicated. ALLERGIES  No Known Allergies    MEDICATIONS  Current Outpatient Medications   Medication Sig    traMADoL (ULTRAM) 50 mg tablet TAKE ONE TABLET BY MOUTH EVERY 8 HOURS AS NEEDED    progesterone (PROMETRIUM) 100 mg capsule TAKE TWO CAPSULES BY MOUTH NIGHTLY    rosuvastatin (CRESTOR) 40 mg tablet Take 40 mg by mouth every evening. cholecalciferol (VITAMIN D3) (50,000 UNITS /1250 MCG) capsule TAKE ONE CAPSULE BY MOUTH ONCE WEEKLY    celecoxib (CELEBREX PO) Take  by mouth. No current facility-administered medications for this visit. SYSTEM REVIEW NOT RELATED TO LIVER DISEASE OR REVIEWED ABOVE:  Constitution systems: Negative for fever, chills, weight gain, weight loss. Eyes: Negative for visual changes. ENT: Negative for sore throat, painful swallowing. Respiratory: Negative for cough, hemoptysis, SOB. Cardiology: Negative for chest pain, palpitations. GI:  Negative for constipation or diarrhea. : Negative for urinary frequency, dysuria, hematuria, nocturia. Skin: Negative for rash. Hematology: Negative for easy bruising, blood clots. Musculo-skeletal: Negative for back pain, muscle pain, weakness. Neurologic: Negative for headaches, dizziness, vertigo, memory problems not related to HE. Psychology: Negative for anxiety, depression. FAMILY HISTORY:  The father disease(s): COPD. The mother  of heart disease. There is no family history of liver disease. SOCIAL HISTORY:  The patient is . The patient has 2 children, and 6 grandchildren. The patient currently smokes 1/2 pack of tobacco daily. The patient has never consumed significant amounts of alcohol. The patient used to work as  at [employer].     The patient has not worked since 2011. PHYSICAL EXAMINATION PERFORMED BY VIRTUAL TELEHEALTH:  VS: Not performed   General: No acute distress. Eyes: Sclera anicteric. ENT: No oral lesions. Skin: No rashes. spider angiomata. No jaundice. Abdomen: No obvious distention suggesting ascites. Extremities: No edema. No muscle wasting. Neurologic: Alert and oriented. Cranial nerves grossly intact. LABORATORY STUDIES:  Liver [place] of [address] Units 6/20/2022 3/18/2022   WBC 3.4 - 10.8 x10E3/uL 7.2 8.5   ANC 1.4 - 7.0 x10E3/uL 3.7 4.8   HGB 11.1 - 15.9 g/dL 13.3 13.3    - 450 x10E3/uL 213 213   AST 0 - 40 IU/L 21 26   ALT 0 - 32 IU/L 24 42   Alk Phos 44 - 121 IU/L 147 (H) 144 (H)   Bili, Total 0.0 - 1.2 mg/dL 0.4 0.4   Bili, Direct 0.00 - 0.40 mg/dL 0.15 0.2   Albumin 3.8 - 4.9 g/dL 4.5 4.1   BUN 6 - 24 mg/dL 11 17   Creat 0.57 - 1.00 mg/dL 0.90 1.19 (H)   Na 134 - 144 mmol/L 143 139   K 3.5 - 5.2 mmol/L 4.1 4.5   Cl 96 - 106 mmol/L 104 108   CO2 20 - 29 mmol/L 24 26   Glucose 65 - 99 mg/dL 102 (H) 102 (H)     From 7/2021  AST/ALT/ALP/T Bili/ALB: 23/25/171/0.4/4.5  WBC/HB/PLT/INR:  NA/BUN/CREAT:    AFP 4.2    Laboratory testing from 6/20/2022 reviewed in detail. Additional testing included to evaluate progression or regression of disease. Laboratory testing results from today will be communicated by My Chart. SEROLOGIES:  7/2021. HBsAntigen negative, anti-HBcore negative, Ferritin 372, iron saturation 20%, TAE negative, ASMA negative, AMA negative, ANCA negative, ceruloplsmin 33, alpha-1-antitrypsin 155. LIVER HISTOLOGY:  7/2021. Fibrosure. A0, 0.12 consistent with no fibrosis. 9/2021. [facility] performed at [facility]. EkPa was 5.7. IQR/med 18%. . The results suggested a fibrosis level of F0-1. The CAP score suggests there is hepatic steatosis. 6/2022. FibroScan performed at [facility]. EkPa was 7.1. IQR/med 12%. . The results suggested a fibrosis level of F1. The CAP score suggests there is hepatic steatosis. ENDOSCOPIC PROCEDURES:  5/2021. EGD by [facility]. Normal.  2/2021. Colonoscopy by [facility]. RADIOLOGY:  3/2021. Triple phase CT scan liver. Changes consistent with fatty liver. No liver mass lesions. Normal spleen. No ascites. 5/2021. Ultrasound of liver. Echogenic consistent with fatty liver. No liver mass lesions. No dilated bile ducts. No ascites. OTHER TESTING:  Not available or performed    FOLLOW-UP AFTER VIRTUAL VISIT:  Pursuant to the emergency declaration under the [facility], [facility] waiver authority and the [employer] Act, this Virtual  Visit was conducted, with the patient's (and/or their legal guardian's) consent, to reduce the patient's risk of exposure to COVID-19 and provide necessary medical care. Services were provided through a video synchronous discussion virtually to substitute for an in-person clinic visit. The patient was located in their home. The provider was located in the [place] [address] office. All of the issues listed above in the Assessment and Plan were discussed with the patient. All questions were answered. The patient expressed a clear understanding of the above. Orders to obtain laboratory testing will be mailed to the patient. An in-person [facility] [address] [facility] 6 months for a Fibroscan. [facility] of [address] [address] Robert Morales, [address] [place] [address] 22.  201 [facility]

## 2022-12-22 NOTE — Clinical Note
NOTIFICATION RETURN TO WORK / SCHOOL    12/22/2022 3:26 PM    Ms. [facility]  [address] [id number]      To Whom It May Concern:    [facility] is currently under the care of [address]. She will return to work/school on: ***    If there are questions or concerns please have the patient contact our office.         Sincerely,      Candelaria Leonard NP

## 2023-06-20 ENCOUNTER — HOSPITAL ENCOUNTER (EMERGENCY)
Facility: HOSPITAL | Age: 58
Discharge: HOME | End: 2023-06-20
Payer: COMMERCIAL

## 2023-06-20 ENCOUNTER — APPOINTMENT (OUTPATIENT)
Facility: HOSPITAL | Age: 58
End: 2023-06-20
Payer: COMMERCIAL

## 2023-06-20 VITALS
RESPIRATION RATE: 18 BRPM | WEIGHT: 174 LBS | DIASTOLIC BLOOD PRESSURE: 76 MMHG | HEIGHT: 67 IN | OXYGEN SATURATION: 98 % | HEART RATE: 94 BPM | TEMPERATURE: 98.3 F | BODY MASS INDEX: 27.31 KG/M2 | SYSTOLIC BLOOD PRESSURE: 131 MMHG

## 2023-06-20 DIAGNOSIS — J20.9 ACUTE BRONCHITIS, UNSPECIFIED ORGANISM: Primary | ICD-10-CM

## 2023-06-20 PROCEDURE — 99283 EMERGENCY DEPT VISIT LOW MDM: CPT

## 2023-06-20 PROCEDURE — 71046 X-RAY EXAM CHEST 2 VIEWS: CPT

## 2023-06-20 RX ORDER — METHYLPREDNISOLONE 4 MG/1
4 TABLET ORAL SEE ADMIN INSTRUCTIONS
Qty: 1 KIT | Refills: 0 | Status: SHIPPED | OUTPATIENT
Start: 2023-06-20

## 2023-06-20 RX ORDER — DEXTROMETHORPHAN HYDROBROMIDE AND PROMETHAZINE HYDROCHLORIDE 15; 6.25 MG/5ML; MG/5ML
2.5 SYRUP ORAL 4 TIMES DAILY PRN
Qty: 80 ML | Refills: 0 | Status: SHIPPED | OUTPATIENT
Start: 2023-06-20 | End: 2023-06-28

## 2023-06-20 ASSESSMENT — PAIN SCALES - GENERAL: PAINLEVEL_OUTOF10: 10

## 2023-06-20 ASSESSMENT — PAIN - FUNCTIONAL ASSESSMENT: PAIN_FUNCTIONAL_ASSESSMENT: 0-10

## 2023-06-20 NOTE — ED PROVIDER NOTES
[address] [facility] Emergency Care  EMERGENCY DEPARTMENT HISTORY AND PHYSICAL EXAM      Date: 6/20/2023  Patient Name: Sherren Lynch  MRN: [id number]  [place]: 1965  Date of evaluation: 6/20/2023  Provider: Sorin Reese PA-C   Note Started: 1:09 PM EDT 6/20/23    HISTORY OF PRESENT ILLNESS     Chief Complaint   Patient presents with    Cough       History Provided By: Patient    HPI: Sherren Lynch is a 62 y.o. female with a past medical history significant for HLD who presents to this ED with CC of cough. Patient reports a 2-week history of cough. States over the past several days she has developed thick white sputum production with cough. Denies any associated symptoms. Denies any modifying factors at symptoms. Denies treating symptoms with anything. States that she is otherwise well has no further concerns. PAST MEDICAL HISTORY   Past Medical History:  History reviewed. No pertinent past medical history. Past Surgical History:  Past Surgical History:   Procedure Laterality Date    HYSTERECTOMY (CERVIX STATUS UNKNOWN)         Family History:  History reviewed. No pertinent family history. Social History:  Social History     Tobacco Use    Smoking status: Every Day     Packs/day: 0.50     Types: Cigarettes    Smokeless tobacco: Never   Substance Use Topics    Alcohol use: Not Currently    Drug use: Never       Allergies: Allergies   Allergen Reactions    Amoxicillin Other (See Comments)       PCP: Taco Ortega MD    Current Meds:   No current facility-administered medications for this encounter. Current Outpatient Medications   Medication Sig Dispense Refill    promethazine-dextromethorphan (PROMETHAZINE-DM) 6.25-15 MG/5ML syrup Take 2.5 mLs by mouth 4 times daily as needed for Cough 80 mL 0    methylPREDNISolone (MEDROL DOSEPACK) 4 MG tablet Take 1 tablet by mouth See Admin Instructions Take by mouth.  1 kit 0    vitamin D (CHOLECALCIFEROL) 05143 UNIT